# Patient Record
Sex: MALE | Race: WHITE | NOT HISPANIC OR LATINO | URBAN - METROPOLITAN AREA
[De-identification: names, ages, dates, MRNs, and addresses within clinical notes are randomized per-mention and may not be internally consistent; named-entity substitution may affect disease eponyms.]

---

## 2018-07-19 ENCOUNTER — APPOINTMENT (RX ONLY)
Dept: URBAN - METROPOLITAN AREA CLINIC 86 | Facility: CLINIC | Age: 83
Setting detail: DERMATOLOGY
End: 2018-07-19

## 2018-07-19 DIAGNOSIS — L10.0 PEMPHIGUS VULGARIS: ICD-10-CM

## 2018-07-19 PROCEDURE — 99213 OFFICE O/P EST LOW 20 MIN: CPT

## 2018-07-19 PROCEDURE — ? ADDITIONAL NOTES

## 2018-07-19 PROCEDURE — ? PRESCRIPTION

## 2018-07-19 PROCEDURE — ? PRESCRIPTION SAMPLES PROVIDED

## 2018-07-19 RX ORDER — PREDNISONE 20 MG/1
TABLET ORAL
Qty: 90 | Refills: 0 | Status: ERX | COMMUNITY
Start: 2018-07-19

## 2018-07-19 RX ORDER — TRIAMCINOLONE ACETONIDE 1 MG/G
CREAM TOPICAL
Qty: 1 | Refills: 1 | Status: ERX | COMMUNITY
Start: 2018-07-19

## 2018-07-19 RX ADMIN — PREDNISONE: 20 TABLET ORAL at 00:00

## 2018-07-19 RX ADMIN — TRIAMCINOLONE ACETONIDE: 1 CREAM TOPICAL at 00:00

## 2018-07-19 ASSESSMENT — LOCATION DETAILED DESCRIPTION DERM
LOCATION DETAILED: RIGHT ANKLE
LOCATION DETAILED: LEFT ANTERIOR DISTAL THIGH
LOCATION DETAILED: PERIUMBILICAL SKIN
LOCATION DETAILED: LEFT ANTERIOR PROXIMAL THIGH
LOCATION DETAILED: RIGHT ANTERIOR PROXIMAL THIGH
LOCATION DETAILED: SUPRAPUBIC SKIN
LOCATION DETAILED: LEFT ANKLE

## 2018-07-19 ASSESSMENT — LOCATION ZONE DERM
LOCATION ZONE: TRUNK
LOCATION ZONE: LEG

## 2018-07-19 ASSESSMENT — LOCATION SIMPLE DESCRIPTION DERM
LOCATION SIMPLE: RIGHT THIGH
LOCATION SIMPLE: ABDOMEN
LOCATION SIMPLE: LEFT THIGH
LOCATION SIMPLE: RIGHT ANKLE
LOCATION SIMPLE: LEFT ANKLE
LOCATION SIMPLE: GROIN

## 2018-07-19 NOTE — PROCEDURE: ADDITIONAL NOTES
Additional Notes: Pt has no hbp no diabetes - pt was warned his levels may be elevated with prednisone

## 2018-07-19 NOTE — HPI: RASH
How Severe Is Your Rash?: severe
Is This A New Presentation, Or A Follow-Up?: Rash
Adequate: hears normal conversation without difficulty

## 2018-07-27 ENCOUNTER — APPOINTMENT (RX ONLY)
Dept: URBAN - METROPOLITAN AREA CLINIC 86 | Facility: CLINIC | Age: 83
Setting detail: DERMATOLOGY
End: 2018-07-27

## 2018-07-27 DIAGNOSIS — L10.0 PEMPHIGUS VULGARIS: ICD-10-CM | Status: RESOLVING

## 2018-07-27 PROCEDURE — ? TREATMENT REGIMEN

## 2018-07-27 PROCEDURE — 99213 OFFICE O/P EST LOW 20 MIN: CPT

## 2018-07-27 PROCEDURE — ? COUNSELING

## 2018-07-27 PROCEDURE — ? ADDITIONAL NOTES

## 2018-07-27 ASSESSMENT — LOCATION SIMPLE DESCRIPTION DERM
LOCATION SIMPLE: LEFT BUTTOCK
LOCATION SIMPLE: GENITALIA
LOCATION SIMPLE: LEFT THIGH
LOCATION SIMPLE: GROIN
LOCATION SIMPLE: LEFT THIGH
LOCATION SIMPLE: RIGHT THIGH
LOCATION SIMPLE: RIGHT THIGH
LOCATION SIMPLE: ABDOMEN
LOCATION SIMPLE: RIGHT ANKLE
LOCATION SIMPLE: LEFT ANKLE

## 2018-07-27 ASSESSMENT — LOCATION ZONE DERM
LOCATION ZONE: LEG
LOCATION ZONE: TRUNK
LOCATION ZONE: GENITALIA
LOCATION ZONE: LEG
LOCATION ZONE: TRUNK

## 2018-07-27 ASSESSMENT — PAIN INTENSITY VAS: HOW INTENSE IS YOUR PAIN 0 BEING NO PAIN, 10 BEING THE MOST SEVERE PAIN POSSIBLE?: 4/10 PAIN

## 2018-07-27 ASSESSMENT — LOCATION DETAILED DESCRIPTION DERM
LOCATION DETAILED: RIGHT ANTERIOR PROXIMAL THIGH
LOCATION DETAILED: LEFT ANTERIOR PROXIMAL THIGH
LOCATION DETAILED: RIGHT ANKLE
LOCATION DETAILED: SUPRAPUBIC SKIN
LOCATION DETAILED: GENITALIA
LOCATION DETAILED: LEFT BUTTOCK
LOCATION DETAILED: RIGHT ANTERIOR PROXIMAL THIGH
LOCATION DETAILED: LEFT ANTERIOR PROXIMAL THIGH
LOCATION DETAILED: PERIUMBILICAL SKIN
LOCATION DETAILED: LEFT ANTERIOR DISTAL THIGH
LOCATION DETAILED: LEFT ANKLE

## 2018-07-27 NOTE — PROCEDURE: TREATMENT REGIMEN
Plan: Taper down on prednisone to 2 pills a day in the morning \\nIf still no new blisters go down to 1 pill every morning for 1 week then 1/2 pills for 2 weeks \\nAdvised pt to call if new blisters develop
Detail Level: Zone

## 2019-10-08 ENCOUNTER — APPOINTMENT (RX ONLY)
Dept: URBAN - METROPOLITAN AREA CLINIC 86 | Facility: CLINIC | Age: 84
Setting detail: DERMATOLOGY
End: 2019-10-08

## 2019-10-08 DIAGNOSIS — L10.0 PEMPHIGUS VULGARIS: ICD-10-CM | Status: INADEQUATELY CONTROLLED

## 2019-10-08 PROCEDURE — ? COUNSELING

## 2019-10-08 PROCEDURE — ? TREATMENT REGIMEN

## 2019-10-08 PROCEDURE — 99213 OFFICE O/P EST LOW 20 MIN: CPT

## 2019-10-08 PROCEDURE — ? ADDITIONAL NOTES

## 2019-10-08 PROCEDURE — ? PRESCRIPTION

## 2019-10-08 PROCEDURE — ? ORDER TESTS

## 2019-10-08 RX ORDER — PREDNISONE 20 MG/1
TABLET ORAL
Qty: 60 | Refills: 0 | Status: ERX | COMMUNITY
Start: 2019-10-08

## 2019-10-08 RX ORDER — MUPIROCIN 20 MG/G
OINTMENT TOPICAL
Qty: 1 | Refills: 0 | Status: ERX | COMMUNITY
Start: 2019-10-08

## 2019-10-08 RX ORDER — CLOBETASOL PROPIONATE 0.5 MG/G
OINTMENT TOPICAL
Qty: 1 | Refills: 0 | Status: ERX | COMMUNITY
Start: 2019-10-08

## 2019-10-08 RX ADMIN — PREDNISONE: 20 TABLET ORAL at 15:44

## 2019-10-08 RX ADMIN — MUPIROCIN: 20 OINTMENT TOPICAL at 15:39

## 2019-10-08 RX ADMIN — CLOBETASOL PROPIONATE: 0.5 OINTMENT TOPICAL at 15:41

## 2019-10-08 ASSESSMENT — SEVERITY ASSESSMENT: SEVERITY: MILD TO MODERATE

## 2019-10-08 ASSESSMENT — PAIN INTENSITY VAS: HOW INTENSE IS YOUR PAIN 0 BEING NO PAIN, 10 BEING THE MOST SEVERE PAIN POSSIBLE?: NO PAIN

## 2019-10-08 ASSESSMENT — LOCATION SIMPLE DESCRIPTION DERM
LOCATION SIMPLE: LEFT THIGH
LOCATION SIMPLE: RIGHT THIGH

## 2019-10-08 ASSESSMENT — LOCATION ZONE DERM: LOCATION ZONE: LEG

## 2019-10-08 ASSESSMENT — LOCATION DETAILED DESCRIPTION DERM
LOCATION DETAILED: RIGHT ANTERIOR PROXIMAL THIGH
LOCATION DETAILED: LEFT ANTERIOR PROXIMAL THIGH

## 2019-10-08 NOTE — PROCEDURE: ORDER TESTS
Performing Laboratory: [id number]
Billing Type: Third-Party Bill
Bill For Surgical Tray: no
Expected Date Of Service: 10/08/2019

## 2019-10-08 NOTE — PROCEDURE: ADDITIONAL NOTES
Additional Notes: Prev biopsy H&E c/w Pv, but DIF more c/w Bullous Pemphigoid.  Presents with flacid bullae today, clinically c/ PV.  Denies oral lesions but does have lesion on distal penis.  Pattern of this flare exactly like prev episodes per pt and his son.  Will check PV titers to follow.  Prev 2 episodes cleared quickly with long remission.  Will recheck one week to begin taper if clearing.
Detail Level: Simple

## 2019-10-08 NOTE — PROCEDURE: TREATMENT REGIMEN
Initiate Treatment: Mupirocin ointment bid \\nClobetasol oint bid to affected area for 2 weeks or less. Can apply to groin area once a day only for a few days.\\nPrednisone 20 mg 3 po qd, taper with improvement.
Detail Level: Zone

## 2019-10-25 ENCOUNTER — APPOINTMENT (RX ONLY)
Dept: URBAN - METROPOLITAN AREA CLINIC 86 | Facility: CLINIC | Age: 84
Setting detail: DERMATOLOGY
End: 2019-10-25

## 2019-10-25 DIAGNOSIS — L10.0 PEMPHIGUS VULGARIS: ICD-10-CM | Status: IMPROVED

## 2019-10-25 PROCEDURE — 99213 OFFICE O/P EST LOW 20 MIN: CPT

## 2019-10-25 PROCEDURE — ? COUNSELING

## 2019-10-25 PROCEDURE — ? TREATMENT REGIMEN

## 2019-10-25 PROCEDURE — ? PRESCRIPTION

## 2019-10-25 RX ORDER — PREDNISONE 20 MG/1
TABLET ORAL
Qty: 21 | Refills: 0 | Status: ERX

## 2019-10-25 ASSESSMENT — LOCATION SIMPLE DESCRIPTION DERM
LOCATION SIMPLE: LEFT THIGH
LOCATION SIMPLE: RIGHT THIGH

## 2019-10-25 ASSESSMENT — LOCATION DETAILED DESCRIPTION DERM
LOCATION DETAILED: LEFT ANTERIOR PROXIMAL THIGH
LOCATION DETAILED: RIGHT ANTERIOR PROXIMAL THIGH

## 2019-10-25 ASSESSMENT — LOCATION ZONE DERM: LOCATION ZONE: LEG

## 2019-10-25 NOTE — PROCEDURE: TREATMENT REGIMEN
Initiate Treatment: Mupirocin ointment bid \\nClobetasol oint bid to affected area for 2 weeks or less. Can apply to groin area once a day only for a few days.\\nPrednisone 20 mg 3 po qd, taper with improvement.
Detail Level: Zone
Samples Given: Impoyz
Continue Regimen: Prednisone 20 mg 2 po for 1 week then 1 po for 1 week
Plan: Patient has always responded well to treatment when he flares. If pt is no better, he can call the office for more treatment.
Initiate Treatment: Impoyz BID

## 2020-02-07 ENCOUNTER — RX ONLY (OUTPATIENT)
Age: 85
Setting detail: RX ONLY
End: 2020-02-07

## 2020-02-07 RX ORDER — BETAMETHASONE DIPROPIONATE 0.5 MG/G
CREAM TOPICAL
Qty: 1 | Refills: 2 | Status: ERX | COMMUNITY
Start: 2020-02-07

## 2020-04-30 ENCOUNTER — RX ONLY (OUTPATIENT)
Age: 85
Setting detail: RX ONLY
End: 2020-04-30

## 2020-04-30 RX ORDER — BETAMETHASONE DIPROPIONATE 0.5 MG/G
CREAM TOPICAL
Qty: 1 | Refills: 2 | Status: ERX

## 2020-04-30 RX ORDER — MUPIROCIN 20 MG/G
OINTMENT TOPICAL
Qty: 1 | Refills: 1 | Status: ERX